# Patient Record
Sex: FEMALE | Race: WHITE
[De-identification: names, ages, dates, MRNs, and addresses within clinical notes are randomized per-mention and may not be internally consistent; named-entity substitution may affect disease eponyms.]

---

## 2017-07-30 ENCOUNTER — HOSPITAL ENCOUNTER (EMERGENCY)
Dept: HOSPITAL 74 - FER | Age: 57
Discharge: HOME | End: 2017-07-30
Payer: COMMERCIAL

## 2017-07-30 VITALS — HEART RATE: 87 BPM | DIASTOLIC BLOOD PRESSURE: 82 MMHG | SYSTOLIC BLOOD PRESSURE: 133 MMHG | TEMPERATURE: 98 F

## 2017-07-30 VITALS — BODY MASS INDEX: 28.3 KG/M2
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
## 2017-07-30 DIAGNOSIS — S52.124A: Primary | ICD-10-CM
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
## 2017-07-30 PROCEDURE — 2W38X1Z IMMOBILIZATION OF RIGHT UPPER EXTREMITY USING SPLINT: ICD-10-PCS | Performed by: STUDENT IN AN ORGANIZED HEALTH CARE EDUCATION/TRAINING PROGRAM

## 2019-04-08 ENCOUNTER — APPOINTMENT (OUTPATIENT)
Dept: OTOLARYNGOLOGY | Facility: CLINIC | Age: 59
End: 2019-04-08
Payer: COMMERCIAL

## 2019-04-08 VITALS
HEIGHT: 61 IN | BODY MASS INDEX: 28.32 KG/M2 | SYSTOLIC BLOOD PRESSURE: 119 MMHG | DIASTOLIC BLOOD PRESSURE: 67 MMHG | WEIGHT: 150 LBS | HEART RATE: 70 BPM

## 2019-04-08 DIAGNOSIS — Z78.9 OTHER SPECIFIED HEALTH STATUS: ICD-10-CM

## 2019-04-08 DIAGNOSIS — Z80.8 FAMILY HISTORY OF MALIGNANT NEOPLASM OF OTHER ORGANS OR SYSTEMS: ICD-10-CM

## 2019-04-08 DIAGNOSIS — H61.21 IMPACTED CERUMEN, RIGHT EAR: ICD-10-CM

## 2019-04-08 DIAGNOSIS — R42 DIZZINESS AND GIDDINESS: ICD-10-CM

## 2019-04-08 DIAGNOSIS — Z85.828 PERSONAL HISTORY OF OTHER MALIGNANT NEOPLASM OF SKIN: ICD-10-CM

## 2019-04-08 DIAGNOSIS — Z86.2 PERSONAL HISTORY OF DISEASES OF THE BLOOD AND BLOOD-FORMING ORGANS AND CERTAIN DISORDERS INVOLVING THE IMMUNE MECHANISM: ICD-10-CM

## 2019-04-08 PROCEDURE — 69210 REMOVE IMPACTED EAR WAX UNI: CPT

## 2019-04-08 PROCEDURE — 99213 OFFICE O/P EST LOW 20 MIN: CPT | Mod: 25

## 2019-04-08 NOTE — HISTORY OF PRESENT ILLNESS
[de-identified] : Patient with known history of vertigo and recurrent otitis. Today presents complaining her right ear is clogged in the shower occasionally.   Denies otalgia, otorrhea, tinnitus, or vertigo.  Patient has no previous otologic history or acoustic trauma.  Also notes intermittent episodes of vertigo that last one day and are positionally associated. MRI normal in the past

## 2019-04-08 NOTE — ASSESSMENT
[FreeTextEntry1] : Intermittent positional vertigo, resolves within one day. MRI has been negative in the past has has CT scan during a much more severe episode a few years ago. Could always consider repeat the NG but at this point I would follow her clinically.\par \par Cerumen impaction, now resolved.  The patient was counseled in aural hygiene and Qtip avoidance.  I offered an audiogram, but the patient refused.\par \par \par \par

## 2019-04-08 NOTE — HISTORY OF PRESENT ILLNESS
[de-identified] : Patient with known history of vertigo and recurrent otitis. Today presents complaining her right ear is clogged in the shower occasionally.   Denies otalgia, otorrhea, tinnitus, or vertigo.  Patient has no previous otologic history or acoustic trauma.  Also notes intermittent episodes of vertigo that last one day and are positionally associated. MRI normal in the past

## 2020-02-04 ENCOUNTER — APPOINTMENT (OUTPATIENT)
Dept: OTOLARYNGOLOGY | Facility: CLINIC | Age: 60
End: 2020-02-04
Payer: COMMERCIAL

## 2020-02-04 VITALS
WEIGHT: 155 LBS | HEIGHT: 61 IN | SYSTOLIC BLOOD PRESSURE: 97 MMHG | DIASTOLIC BLOOD PRESSURE: 69 MMHG | HEART RATE: 79 BPM | BODY MASS INDEX: 29.27 KG/M2

## 2020-02-04 DIAGNOSIS — J31.0 CHRONIC RHINITIS: ICD-10-CM

## 2020-02-04 PROCEDURE — 99213 OFFICE O/P EST LOW 20 MIN: CPT | Mod: 25

## 2020-02-04 PROCEDURE — 69210 REMOVE IMPACTED EAR WAX UNI: CPT

## 2020-02-04 RX ORDER — DICLOFENAC SODIUM 10 MG/G
1 GEL TOPICAL
Qty: 100 | Refills: 0 | Status: ACTIVE | COMMUNITY
Start: 2019-11-06

## 2020-02-04 NOTE — HISTORY OF PRESENT ILLNESS
[de-identified] : Patient with known history of vertigo, rhinitis and recurrent otitis. Today presents complaining her ears are clogged for the past few days.   Denies otalgia, otorrhea, tinnitus, or vertigo.  Patient has no previous otologic history or acoustic trauma.  Also notes intermittent episodes of vertigo that last one day and are positionally associated. MRI normal in the past.  Notes she is using nasal saline sprays on a daily basis.  Recently had a dental implant and is on amoxicillin

## 2020-02-04 NOTE — ASSESSMENT
[FreeTextEntry1] : Cerumen impaction, now resolved.  The patient was counseled in aural hygiene and Qtip avoidance.  I offered an audiogram, but the patient refused.\par \par Chronic rhinitis, doing well with saline sprays\par \par \par \par

## 2023-03-28 ENCOUNTER — APPOINTMENT (OUTPATIENT)
Dept: OTOLARYNGOLOGY | Facility: CLINIC | Age: 63
End: 2023-03-28
Payer: MEDICAID

## 2023-03-28 VITALS — WEIGHT: 140 LBS | BODY MASS INDEX: 26.43 KG/M2 | HEIGHT: 61 IN

## 2023-03-28 PROCEDURE — 99202 OFFICE O/P NEW SF 15 MIN: CPT | Mod: 25

## 2023-03-28 PROCEDURE — 69210 REMOVE IMPACTED EAR WAX UNI: CPT

## 2023-03-28 RX ORDER — AMOXICILLIN 500 MG/1
500 CAPSULE ORAL
Qty: 21 | Refills: 0 | Status: DISCONTINUED | COMMUNITY
Start: 2020-01-31 | End: 2023-03-28

## 2023-03-28 NOTE — HISTORY OF PRESENT ILLNESS
[de-identified] : Initial visit, she is last seen over 3 years ago.\par History of recurrent bilateral cerumen impaction.\par Complaining of bilateral ear congestion.

## 2023-03-28 NOTE — ASSESSMENT
[FreeTextEntry1] : -Findings were reviewed and discussed with the patient in detail\par -Good aural hygiene reviewed\par -Patient may use wax removal drops as needed\par \par \par -I will see the patient in follow up prn.

## 2024-02-20 ENCOUNTER — APPOINTMENT (OUTPATIENT)
Dept: OTOLARYNGOLOGY | Facility: CLINIC | Age: 64
End: 2024-02-20
Payer: MEDICARE

## 2024-02-20 DIAGNOSIS — H61.23 IMPACTED CERUMEN, BILATERAL: ICD-10-CM

## 2024-02-20 DIAGNOSIS — H90.2 CONDUCTIVE HEARING LOSS, UNSPECIFIED: ICD-10-CM

## 2024-02-20 PROCEDURE — 69210 REMOVE IMPACTED EAR WAX UNI: CPT

## 2024-02-20 PROCEDURE — 99212 OFFICE O/P EST SF 10 MIN: CPT | Mod: 25

## 2024-02-20 NOTE — HISTORY OF PRESENT ILLNESS
[de-identified] : History of recurrent bilateral cerumen impaction. Complaining of bilateral ear congestion.

## 2025-02-18 ENCOUNTER — NON-APPOINTMENT (OUTPATIENT)
Age: 65
End: 2025-02-18

## 2025-02-18 ENCOUNTER — APPOINTMENT (OUTPATIENT)
Dept: OTOLARYNGOLOGY | Facility: CLINIC | Age: 65
End: 2025-02-18
Payer: COMMERCIAL

## 2025-02-18 DIAGNOSIS — H90.2 CONDUCTIVE HEARING LOSS, UNSPECIFIED: ICD-10-CM

## 2025-02-18 DIAGNOSIS — H61.23 IMPACTED CERUMEN, BILATERAL: ICD-10-CM

## 2025-02-18 PROCEDURE — 69210 REMOVE IMPACTED EAR WAX UNI: CPT

## 2025-02-18 PROCEDURE — 99213 OFFICE O/P EST LOW 20 MIN: CPT | Mod: 25

## 2025-04-28 NOTE — PDOC
History of Present Illness





- General


History Source: Patient


Exam Limitations: No Limitations





- History of Present Illness


Initial Comments: 


07/30/17 15:06


The patient is a 56 year old female, with no significant past medical history, 

who presents to the emergency department with right elbow pain status post a 

fall earlier today at 11:30am while she was hiking. The patient reports that 

she was walking on a trail when she tripped over a root, falling forward, and 

breaking her fall with an outstretched right arm. She notes that  moving her 

right elbow, particularly pronation and the fingers of her right hand 

exacerbate her pain. The patient notes associated numbness at the tips of her 

fingers on her right hand, minimal shoulder discomfort, and a few superficial 

scrapes on her right elbow and left knee. She states that she did take 2 advil 

prior to arrival which helped alleviate her pain.


The patient reports no previous injury or trauma to her right arm. Pt denies 

any head injury, LOC.





She denies loss of consciousness, head injury, neck pain, and back pain. 


She denies chest pain, palpitation,  shortness of breath, headache, 

lightheadedness, and dizziness. The patient denies other bodily pain or injury. 


Allergies: None


Social history: Never smoked, 1-2 drinks per day


PCP: Dr. Quang Joy











<Melly Lagos - Last Filed: 07/30/17 15:06>





<Osito Ware - Last Filed: 07/30/17 16:12>





- General


Chief Complaint: Injury


Stated Complaint: RIGHT ARM PAIN


Time Seen by Provider: 07/30/17 13:51





Past History





<Melly Lagos - Last Filed: 07/30/17 15:06>





<Osito Ware - Last Filed: 07/30/17 16:12>





- Past Medical History


Allergies/Adverse Reactions: 


 Allergies











Allergy/AdvReac Type Severity Reaction Status Date / Time


 


No Known Allergies Allergy   Unverified 07/30/17 13:49











Home Medications: 


Ambulatory Orders





Levothyroxine [Synthroid -] 25 mcg PO DAILY 07/30/17 


Oxycodone HCl/Acetaminophen [Percocet 5-325 mg Tablet -] 1 combo PO Q6H PRN #10 

tablet MDD 4 07/30/17 











**Review of Systems





- Review of Systems


Able to Perform ROS?: Yes


Comments:: 





07/30/17 15:07


CONSTITUTIONAL:


No reported: Fever, Chills, Diaphoresis, Generalized Weakness, Malaise, Loss of 

Appetite


HEENT:


No reported: Rhinorrhea, Nasal Congestion, Throat Pain, Throat Swelling, 

Difficulty Swallowing, Mouth Swelling, Ear Pain, Eye Pain, Visual Changes


CARDIOVASCULAR:


No reported: Chest Pain, Syncope, Palpitations, Irregular Heart Rate, 

Lightheadedness, Peripheral Edema


RESPIRATORY:


No reported: Cough, Shortness of Breath, SOB with Exertion, Orthopnea, Wheezing

, Stridor, Hemoptysis


GASTROINTESTINAL:


No reported: Abdominal pain, Abdominal Distension, Nausea, Vomiting, Diarrhea, 

Constipation, Melena, Hematochezia


GENITOURINARY:


No reported: Dysuria, Frequency, Urgency, Hesitancy, Flank Pain, Genital Pain


MUSCULOSKELETAL: +Right elbow pain, +Right shoulder discomfort


No reported: Joint Swelling, Back pain, Neck Pain


SKIN: +Scrapes to her right elbow and left knee


No reported: Rash, Itching, Pallor


HEMATOLOGIC/IMMUNOLOGIC:


No reported: Easy Bleeding, Easy Bruising, Lymphadenopathy, Frequent infections


ENDOCRINE:


No reported: Unexplained Weight Gain, Unexplained Weight Loss, Heat Intolerance

, Cold Intolerance


NEUROLOGIC


No reported: Headache, Focal Weakness,  Vertigo, Lightheadedness, Unsteady Gait

, Seizure, Mental Status Changes, Incontinence


PSYCHIATRIC:


No reported: Anxiety, Depression 





<Melly Lagos - Last Filed: 07/30/17 15:06>





*Physical Exam





- Vital Signs


 Last Vital Signs











Temp Pulse Resp BP Pulse Ox


 


 98 F   87   20   133/82   99 


 


 07/30/17 13:49  07/30/17 13:49  07/30/17 13:49  07/30/17 13:49  07/30/17 13:49














- Physical Exam


Comments: 





07/30/17 15:07


GENERAL: The patient is awake, alert, and fully oriented, Nontoxic - in no 

acute distress.


HEAD: Normocephalic, atraumatic.


EYES: extraocular movements intact, sclera anicteric, conjunctiva clear.


ENT: Normal voice,  Moist mucous membranes.


NECK: Normal range of motion, supple


LUNGS: Breath sounds equal, clear to auscultation bilaterally.  No wheezes, no 

rhonchi, no rales.


HEART: Regular rate and rhythm, normal S1 and S2 without murmur, rub or gallop.


ABDOMEN: Soft, nontender, normoactive bowel sounds.  No guarding, no rebound.  

. No CVA tenderness


EXTREMITIES: Mild tenderness to radial head of R arm, +pain with pronation of 

arm, mild tenderness to distal forearm and dorsal wrist. sensation an dpulses 

symmetric and intact. Able to flex/extend R elbow with iminal discomfort. No 

limitations of ROM of shoulder, nor LUE, b/l LE.  


Back: No midline tenderness to the cervical, thoracic or lumbar spine


Musculoskelatal: FROM of b/l shoulders, FROM of hips, knees, ankles - No signs 

of ecchymosis, erythema, or crepitus noted on palpation extremities, chest wall

, clavicals, ribs, back.   Minimal tenderness of L anterior shoulder that seems 

muscular - no bony tenderness, normal ROM of both shoulders without any 

discomfort or limitations.


NEUROLOGICAL: No facial assymetry, Normal speech, 


PSYCH: Normal mood, normal affect.


SKIN: superficial abrasion to L knee 





<Melly Lagos - Last Filed: 07/30/17 15:06>





Procedures





- Consent


Consent obtained: Verbal





- Splinting


Splint Location: Right: Elbow


Pre-Proc Neuro Vasc Exam: normal


Hand-Made Type: orthoglass


Splint Type: Yes: Posterior


Post-Proc Neuro Vasc Exam: normal


Ace Bandage: yes, 4"


Sling: Yes


Complications: No


Post splint xray: No





<Osito Ware - Last Filed: 07/30/17 16:12>





Medical Decision Making





- Medical Decision Making


07/30/17 14:33


56y F no pmhx presents with R elbow / wrist pain s/p mechanical fall after 

tripping on a root. The pt endorses pain to her forearm when she supinates her 

wrist. no other injuries or pain. TTP to radial head and mild tendernes to 

dorsal wrist R wrist, n/v intact, pulses intact.





will obtain xray


pt declines pain meds, had ibuprofen approx 1 hr prior to arrival





A portion of this note was documented by scribe services under my direction. I 

have reviewed the details of the note, within reason, and agree with the 

documentation with the following case summary and management plan written by me





07/30/17 16:05


pts xray shows possible impaction fx of prxo radial head


pt put in a posterior splint with sling


will have pt fu with orthopedics for further evaluation


none WB until evaulated by ortho


return precautions were discussed





I discussed the physical exam findings, ancillary test results and final 

diagnoses with the patient. I answered all of the patient's questions. The 

patient was satisfied with the care received and felt comfortable with the 

discharge plan and treatment plan. The patient will call their primary care 

physician within 24 hours to arrange follow-up and will return to the Emergency 

Department with any new, persistent or worsening symptoms. 














<Osito Ware - Last Filed: 07/30/17 16:12>





*DC/Admit/Observation/Transfer





- Attestations


Scribe Attestion: 





07/30/17 15:08





Documentation prepared by OLIVIA Crooks, acting as medical scribe for 

Osito Ware MD.





<Melly Lagos - Last Filed: 07/30/17 15:06>





- Discharge Dispostion


Admit: No





<Osito Ware - Last Filed: 07/30/17 16:12>


Diagnosis at time of Disposition: 


 Traumatic closed nondisp fx of head or proximal epiphysis of radius





- Discharge Dispostion


Condition at time of disposition: Stable





- Prescriptions


Prescriptions: 


Oxycodone HCl/Acetaminophen [Percocet 5-325 mg Tablet -] 1 combo PO Q6H PRN #10 

tablet MDD 4


 PRN Reason: Pain





- Referrals


Referrals: 


Matheus Khan MD [Staff Physician] - 





- Patient Instructions


Printed Discharge Instructions:  How to Use a Sling, DI for Elbow Fracture


Additional Instructions: 


Return to the emergency department immediately with ANY new, persistent or 

worsening symptoms including wrosening pain, numbness/tingling/weakness of your 

fingers/hand. 





TAke motrin for pain, if you have more sever pain, you may take a percocet. 





You MUST call and follow up with an orthopedist for further evaluation of your 

symptoms within 3-4 days.  Results were discussed with you. Please make sure 

your doctor reviews the results of your emergency evaluation.





If you had any xrays during your visit, it was read preliminarily by myself, a 

Radiologist will review it and if there are any additional findings we will 

call you.


Print Language: ENGLISH normal...